# Patient Record
Sex: MALE | Race: BLACK OR AFRICAN AMERICAN | NOT HISPANIC OR LATINO | Employment: FULL TIME | URBAN - METROPOLITAN AREA
[De-identification: names, ages, dates, MRNs, and addresses within clinical notes are randomized per-mention and may not be internally consistent; named-entity substitution may affect disease eponyms.]

---

## 2021-10-13 ENCOUNTER — HOSPITAL ENCOUNTER (EMERGENCY)
Facility: HOSPITAL | Age: 26
Discharge: PSYCHIATRIC OR SUBSTANCE USE FACILITY | End: 2021-10-13
Attending: EMERGENCY MEDICINE

## 2021-10-13 ENCOUNTER — HOSPITAL ENCOUNTER (INPATIENT)
Facility: HOSPITAL | Age: 26
LOS: 3 days | Discharge: HOME | End: 2021-10-16
Attending: PSYCHIATRY & NEUROLOGY | Admitting: PSYCHIATRY & NEUROLOGY

## 2021-10-13 VITALS
TEMPERATURE: 98.5 F | SYSTOLIC BLOOD PRESSURE: 124 MMHG | OXYGEN SATURATION: 94 % | RESPIRATION RATE: 18 BRPM | DIASTOLIC BLOOD PRESSURE: 83 MMHG | HEIGHT: 72 IN | BODY MASS INDEX: 20.39 KG/M2 | WEIGHT: 150.57 LBS | HEART RATE: 58 BPM

## 2021-10-13 DIAGNOSIS — T50.902A INTENTIONAL DRUG OVERDOSE, INITIAL ENCOUNTER (HCC): ICD-10-CM

## 2021-10-13 DIAGNOSIS — T14.91XA SUICIDE ATTEMPT (HCC): Primary | ICD-10-CM

## 2021-10-13 PROBLEM — F12.10 MARIJUANA ABUSE: Status: ACTIVE | Noted: 2021-10-13

## 2021-10-13 PROBLEM — F32.A DEPRESSION: Status: ACTIVE | Noted: 2021-10-13

## 2021-10-13 PROBLEM — F33.1 MAJOR DEPRESSIVE DISORDER, RECURRENT EPISODE, MODERATE (HCC): Status: ACTIVE | Noted: 2021-10-13

## 2021-10-13 LAB
ALBUMIN SERPL-MCNC: 4.6 G/DL (ref 3.5–5.2)
ALBUMIN/GLOB SERPL: 1.8 G/DL
ALP SERPL-CCNC: 53 U/L (ref 39–117)
ALT SERPL W P-5'-P-CCNC: 8 U/L (ref 1–41)
AMPHET+METHAMPHET UR QL: NEGATIVE
ANION GAP SERPL CALCULATED.3IONS-SCNC: 13.5 MMOL/L (ref 5–15)
APAP SERPL-MCNC: 6.2 MCG/ML (ref 0–30)
APAP SERPL-MCNC: 9.2 MCG/ML (ref 0–30)
APTT PPP: 24 SECONDS (ref 22.2–34.2)
AST SERPL-CCNC: 17 U/L (ref 1–40)
BARBITURATES UR QL SCN: NEGATIVE
BASOPHILS # BLD AUTO: 0.01 10*3/MM3 (ref 0–0.2)
BASOPHILS # BLD AUTO: 0.02 10*3/MM3 (ref 0–0.2)
BASOPHILS NFR BLD AUTO: 0.2 % (ref 0–1.5)
BASOPHILS NFR BLD AUTO: 0.4 % (ref 0–1.5)
BENZODIAZ UR QL SCN: NEGATIVE
BILIRUB SERPL-MCNC: 0.7 MG/DL (ref 0–1.2)
BUN SERPL-MCNC: 10 MG/DL (ref 6–20)
BUN/CREAT SERPL: 9.7 (ref 7–25)
CALCIUM SPEC-SCNC: 8.7 MG/DL (ref 8.6–10.5)
CANNABINOIDS SERPL QL: POSITIVE
CHLORIDE SERPL-SCNC: 103 MMOL/L (ref 98–107)
CO2 SERPL-SCNC: 25.5 MMOL/L (ref 22–29)
COCAINE UR QL: NEGATIVE
CREAT SERPL-MCNC: 1.03 MG/DL (ref 0.76–1.27)
DEPRECATED RDW RBC AUTO: 41.1 FL (ref 37–54)
DEPRECATED RDW RBC AUTO: 41.4 FL (ref 37–54)
EOSINOPHIL # BLD AUTO: 0.12 10*3/MM3 (ref 0–0.4)
EOSINOPHIL # BLD AUTO: 0.2 10*3/MM3 (ref 0–0.4)
EOSINOPHIL NFR BLD AUTO: 2.7 % (ref 0.3–6.2)
EOSINOPHIL NFR BLD AUTO: 4.3 % (ref 0.3–6.2)
ERYTHROCYTE [DISTWIDTH] IN BLOOD BY AUTOMATED COUNT: 12.5 % (ref 12.3–15.4)
ERYTHROCYTE [DISTWIDTH] IN BLOOD BY AUTOMATED COUNT: 12.8 % (ref 12.3–15.4)
ETHANOL BLD-MCNC: <10 MG/DL (ref 0–10)
ETHANOL UR QL: <0.01 %
GFR SERPL CREATININE-BSD FRML MDRD: 105 ML/MIN/1.73
GLOBULIN UR ELPH-MCNC: 2.5 GM/DL
GLUCOSE SERPL-MCNC: 92 MG/DL (ref 65–99)
HCT VFR BLD AUTO: 39.3 % (ref 37.5–51)
HCT VFR BLD AUTO: 40.6 % (ref 37.5–51)
HGB BLD-MCNC: 13.3 G/DL (ref 13–17.7)
HGB BLD-MCNC: 13.8 G/DL (ref 13–17.7)
HOLD SPECIMEN: NORMAL
HOLD SPECIMEN: NORMAL
IMM GRANULOCYTES # BLD AUTO: 0.01 10*3/MM3 (ref 0–0.05)
IMM GRANULOCYTES # BLD AUTO: 0.01 10*3/MM3 (ref 0–0.05)
IMM GRANULOCYTES NFR BLD AUTO: 0.2 % (ref 0–0.5)
IMM GRANULOCYTES NFR BLD AUTO: 0.2 % (ref 0–0.5)
INR PPP: 1.01 (ref 2–3)
LYMPHOCYTES # BLD AUTO: 1.44 10*3/MM3 (ref 0.7–3.1)
LYMPHOCYTES # BLD AUTO: 1.79 10*3/MM3 (ref 0.7–3.1)
LYMPHOCYTES NFR BLD AUTO: 32.4 % (ref 19.6–45.3)
LYMPHOCYTES NFR BLD AUTO: 38.2 % (ref 19.6–45.3)
MCH RBC QN AUTO: 29.9 PG (ref 26.6–33)
MCH RBC QN AUTO: 30.1 PG (ref 26.6–33)
MCHC RBC AUTO-ENTMCNC: 33.8 G/DL (ref 31.5–35.7)
MCHC RBC AUTO-ENTMCNC: 34 G/DL (ref 31.5–35.7)
MCV RBC AUTO: 88.3 FL (ref 79–97)
MCV RBC AUTO: 88.5 FL (ref 79–97)
METHADONE UR QL SCN: NEGATIVE
MONOCYTES # BLD AUTO: 0.55 10*3/MM3 (ref 0.1–0.9)
MONOCYTES # BLD AUTO: 0.66 10*3/MM3 (ref 0.1–0.9)
MONOCYTES NFR BLD AUTO: 12.4 % (ref 5–12)
MONOCYTES NFR BLD AUTO: 14.1 % (ref 5–12)
NEUTROPHILS NFR BLD AUTO: 2.01 10*3/MM3 (ref 1.7–7)
NEUTROPHILS NFR BLD AUTO: 2.32 10*3/MM3 (ref 1.7–7)
NEUTROPHILS NFR BLD AUTO: 42.8 % (ref 42.7–76)
NEUTROPHILS NFR BLD AUTO: 52.1 % (ref 42.7–76)
NRBC BLD AUTO-RTO: 0 /100 WBC (ref 0–0.2)
NRBC BLD AUTO-RTO: 0 /100 WBC (ref 0–0.2)
OPIATES UR QL: NEGATIVE
OXYCODONE UR QL SCN: NEGATIVE
PLATELET # BLD AUTO: 191 10*3/MM3 (ref 140–450)
PLATELET # BLD AUTO: 206 10*3/MM3 (ref 140–450)
PMV BLD AUTO: 9.5 FL (ref 6–12)
PMV BLD AUTO: 9.6 FL (ref 6–12)
POTASSIUM SERPL-SCNC: 3.5 MMOL/L (ref 3.5–5.2)
PROT SERPL-MCNC: 7.1 G/DL (ref 6–8.5)
PROTHROMBIN TIME: 11 SECONDS (ref 9.4–12)
RBC # BLD AUTO: 4.45 10*6/MM3 (ref 4.14–5.8)
RBC # BLD AUTO: 4.59 10*6/MM3 (ref 4.14–5.8)
SALICYLATES SERPL-MCNC: 6.4 MG/DL
SALICYLATES SERPL-MCNC: 7.4 MG/DL
SALICYLATES SERPL-MCNC: 7.5 MG/DL
SARS-COV-2 RNA RESP QL NAA+PROBE: NOT DETECTED
SODIUM SERPL-SCNC: 142 MMOL/L (ref 136–145)
T4 FREE SERPL-MCNC: 1.26 NG/DL (ref 0.93–1.7)
TSH SERPL DL<=0.05 MIU/L-ACNC: 7.55 UIU/ML (ref 0.27–4.2)
WBC # BLD AUTO: 4.45 10*3/MM3 (ref 3.4–10.8)
WBC # BLD AUTO: 4.69 10*3/MM3 (ref 3.4–10.8)
WHOLE BLOOD HOLD SPECIMEN: NORMAL
WHOLE BLOOD HOLD SPECIMEN: NORMAL

## 2021-10-13 PROCEDURE — 80053 COMPREHEN METABOLIC PANEL: CPT | Performed by: NURSE PRACTITIONER

## 2021-10-13 PROCEDURE — 82077 ASSAY SPEC XCP UR&BREATH IA: CPT | Performed by: NURSE PRACTITIONER

## 2021-10-13 PROCEDURE — 80307 DRUG TEST PRSMV CHEM ANLYZR: CPT | Performed by: NURSE PRACTITIONER

## 2021-10-13 PROCEDURE — 84439 ASSAY OF FREE THYROXINE: CPT | Performed by: NURSE PRACTITIONER

## 2021-10-13 PROCEDURE — U0003 INFECTIOUS AGENT DETECTION BY NUCLEIC ACID (DNA OR RNA); SEVERE ACUTE RESPIRATORY SYNDROME CORONAVIRUS 2 (SARS-COV-2) (CORONAVIRUS DISEASE [COVID-19]), AMPLIFIED PROBE TECHNIQUE, MAKING USE OF HIGH THROUGHPUT TECHNOLOGIES AS DESCRIBED BY CMS-2020-01-R: HCPCS | Performed by: NURSE PRACTITIONER

## 2021-10-13 PROCEDURE — 84443 ASSAY THYROID STIM HORMONE: CPT | Performed by: NURSE PRACTITIONER

## 2021-10-13 PROCEDURE — 85730 THROMBOPLASTIN TIME PARTIAL: CPT | Performed by: EMERGENCY MEDICINE

## 2021-10-13 PROCEDURE — 85025 COMPLETE CBC W/AUTO DIFF WBC: CPT | Performed by: PSYCHIATRY & NEUROLOGY

## 2021-10-13 PROCEDURE — 80143 DRUG ASSAY ACETAMINOPHEN: CPT | Performed by: NURSE PRACTITIONER

## 2021-10-13 PROCEDURE — 85610 PROTHROMBIN TIME: CPT | Performed by: EMERGENCY MEDICINE

## 2021-10-13 PROCEDURE — 85025 COMPLETE CBC W/AUTO DIFF WBC: CPT | Performed by: NURSE PRACTITIONER

## 2021-10-13 PROCEDURE — 80179 DRUG ASSAY SALICYLATE: CPT | Performed by: NURSE PRACTITIONER

## 2021-10-13 PROCEDURE — 99285 EMERGENCY DEPT VISIT HI MDM: CPT

## 2021-10-13 PROCEDURE — 80143 DRUG ASSAY ACETAMINOPHEN: CPT | Performed by: EMERGENCY MEDICINE

## 2021-10-13 PROCEDURE — 80179 DRUG ASSAY SALICYLATE: CPT | Performed by: EMERGENCY MEDICINE

## 2021-10-13 RX ORDER — HALOPERIDOL 5 MG/ML
5 INJECTION INTRAMUSCULAR EVERY 4 HOURS PRN
Status: DISCONTINUED | OUTPATIENT
Start: 2021-10-13 | End: 2021-10-16 | Stop reason: HOSPADM

## 2021-10-13 RX ORDER — LORAZEPAM 2 MG/ML
2 INJECTION INTRAMUSCULAR EVERY 4 HOURS PRN
Status: DISCONTINUED | OUTPATIENT
Start: 2021-10-13 | End: 2021-10-16 | Stop reason: HOSPADM

## 2021-10-13 RX ORDER — ACETAMINOPHEN 325 MG/1
650 TABLET ORAL EVERY 4 HOURS PRN
Status: DISCONTINUED | OUTPATIENT
Start: 2021-10-13 | End: 2021-10-16 | Stop reason: HOSPADM

## 2021-10-13 RX ORDER — SODIUM CHLORIDE 0.9 % (FLUSH) 0.9 %
10 SYRINGE (ML) INJECTION AS NEEDED
Status: DISCONTINUED | OUTPATIENT
Start: 2021-10-13 | End: 2021-10-13 | Stop reason: HOSPADM

## 2021-10-13 RX ORDER — ALUMINA, MAGNESIA, AND SIMETHICONE 2400; 2400; 240 MG/30ML; MG/30ML; MG/30ML
15 SUSPENSION ORAL EVERY 6 HOURS PRN
Status: DISCONTINUED | OUTPATIENT
Start: 2021-10-13 | End: 2021-10-16 | Stop reason: HOSPADM

## 2021-10-13 RX ORDER — DIPHENHYDRAMINE HYDROCHLORIDE 50 MG/ML
50 INJECTION INTRAMUSCULAR; INTRAVENOUS EVERY 4 HOURS PRN
Status: DISCONTINUED | OUTPATIENT
Start: 2021-10-13 | End: 2021-10-16 | Stop reason: HOSPADM

## 2021-10-13 RX ORDER — LORAZEPAM 2 MG/1
2 TABLET ORAL EVERY 4 HOURS PRN
Status: DISCONTINUED | OUTPATIENT
Start: 2021-10-13 | End: 2021-10-16 | Stop reason: HOSPADM

## 2021-10-13 RX ORDER — HALOPERIDOL 5 MG/1
5 TABLET ORAL EVERY 4 HOURS PRN
Status: DISCONTINUED | OUTPATIENT
Start: 2021-10-13 | End: 2021-10-16 | Stop reason: HOSPADM

## 2021-10-13 RX ORDER — LOPERAMIDE HYDROCHLORIDE 2 MG/1
2 CAPSULE ORAL
Status: DISCONTINUED | OUTPATIENT
Start: 2021-10-13 | End: 2021-10-16 | Stop reason: HOSPADM

## 2021-10-13 RX ORDER — DIPHENHYDRAMINE HCL 50 MG
50 CAPSULE ORAL EVERY 4 HOURS PRN
Status: DISCONTINUED | OUTPATIENT
Start: 2021-10-13 | End: 2021-10-16 | Stop reason: HOSPADM

## 2021-10-13 RX ORDER — HYDROXYZINE PAMOATE 50 MG/1
50 CAPSULE ORAL EVERY 6 HOURS PRN
Status: DISCONTINUED | OUTPATIENT
Start: 2021-10-13 | End: 2021-10-16 | Stop reason: HOSPADM

## 2021-10-13 RX ADMIN — Medication: at 10:31

## 2021-10-13 RX ADMIN — SERTRALINE HYDROCHLORIDE 50 MG: 50 TABLET ORAL at 15:00

## 2021-10-13 NOTE — H&P
"[facility] [place]   PSYCHIATRIC  HISTORY AND PHYSICAL    Patient Name: Rafael Juarez  : 1995  MRN: [id number]  Primary Care Physician:  Provider, No Known  Date of admission: 10/13/2021    Subjective   Subjective     Chief Complaint: \"My depression and anxiety\"    HPI:     Rafael Juarez is a 26 y.o. male self-referred emergency room admitted on a voluntary basis.  He is brought in by ambulance.  Patient reports that his depression and anxiety get very severe yesterday, he got increasingly upset, and then he took a bunch of pills in a suicide attempt.  He thinks he took Aleve but it could have been Tylenol.  Labs are normal Tylenol level never got into the double digits and decreased on emergency room.  Patient reports he took the overdose because he was very stressed out.  Reports that family life and personal life have been overwhelming.  Patient reports he took the overdose realized it was a mistake called an ambulance.    Patient reports his depression makes him think too much.  Reports that he will feel stuck in his mind and cannot move forward.  Patient also reports he has decreased desire to go out and do things, poor motivation, and does not want to go anywhere.  Patient reports that he is just not himself at this time.  Patient reports ruminating about things and thinking too much.  Reports that his sleep is been decreased for about the last week.  Reports he has had very low energy.  When asked about feeling hopeless or helpless the patient thinks a very long time about this then nods his head that he has felt little hopeless or helpless.  He denies suicidal ideation today and asked what is different he reports he knows that he can get help, knows that he has an outlet now, and just has a brighter outlook on life.    Patient reports he needs to work on how to cope and deal learned how to feel relief from the stress.  Patient reports that he is also got some rest which is helped his " depression.  Patient reports that he feels he needs to take some pressure off of his life and feels that depression is beginning to alleviate now.    Patient also reports he feels like he is very poor self-control.  Reports that he cannot get in her routine despite trying to make a schedule for himself.  Reports that he will make schedules, put them in his phone, never helped himself accountable.  Reports that he just will sit and not do what he needs to do on a daily basis.  Reports is not consistent on how he acts or behaves.        Review of Systems:      CONSTITUTIONAL: no fever, no night sweats  HEENT: no blurring of vision, no double vision, no hearing difficulty, no tinnitus, no dysplasia, no epistaxis  LUNGS: no cough, no hemoptysis, no wheeze, no shortness of breath;  CARDIOVASCULAR: no palpitation, no chest pain, no shortness of breath;  GI: no abdominal pain, no nausea, no vomiting, no diarrhea, no constipation;  KELSI: no dysuria, no hematuria, no frequency or urgency, no nephrolithiasis;  MUSCULOSKELETAL: no joint pain, no swelling, no stiffness;  ENDOCRINE: no polyuria, no polydipsia, no cold or heat intolerance;  HEMATOLOGY: no easy bruising or bleeding, no history of clotting disorder;  DERMATOLOGY: no skin rash, no eczema, no pruritus;  NEUROLOGY: no syncope, no seizures, no numbness or tingling of hands, no numbness or tingling of feet, no paresis;    Personal History     Past Medical History:   Diagnosis Date   • Bipolar depression (HCC)    • Schizoaffective disorder (HCC)    • Seizures (HCC)        No past surgical history on file.    Past Psychiatric History: Does not have a current provider.  Reports he saw a provider a couple years ago.  Reports previous diagnosis of schizoaffective disorder bipolar type.  Not currently on any medications.    Psychiatric Hospitalizations: Reports 2 previous hospitalizations to this    Suicide Attempts: Reports a history of 4-5 previous suicide attempts    Prior  Treatment and Medications Tried: Does not recall names of previous medicines he is been on, but reports he is reluctant to take medicines because he has no self-control no matter what pill he put them on he will take them all.      Family History: family history includes Deep vein thrombosis in his mother; Depression in his maternal grandmother, mother, and sister; Hypertension in his father; Schizophrenia in his paternal uncle; Suicide Attempts in his sister. Otherwise pertinent FHx was reviewed and not pertinent to current issue.      Social History: Born and raised in [place].  Moved to [facility] [place].  Moved at [facility] from [id number] now back in [place] since August of this year.  Came back to [place] because girlfriend was here.  Patient reports she went to school into the 12th grade but his credits got messed up and was told he had do another year high school refused to do it did not officially graduate.  He does not have a GED.  He has been  on one occasion .  He has 2 children from 2 different mothers.  Currently lives with a girlfriend.  He is currently unemployed.  Never been in .  Denies any history of abuse.  Denies that he is Baptism but reports he is very spiritual.  Patient has been depressed in the past.  He reports he does have some current charges and supposed to go to court on Friday of this week.      Substance Abuse History: reports that he has been smoking. He does not have any smokeless tobacco history on file. He reports previous alcohol use. He reports current drug use. Drug: Marijuana.    Home Medications:          Allergies:  No Known Allergies    Objective   Objective     Vitals:   Temp:  [97.7 °F (36.5 °C)-98.5 °F (36.9 °C)] 97.7 °F (36.5 °C)  Heart Rate:  [47-75] 57  Resp:  [18] 18  BP: (106-128)/(71-86) 106/71    Physical Exam:     CONSTITUTIONAL: Patient is well developed, well nourished, awake and alert.  HEENT: Head and  neck are normocephalic and atraumatic. Pupils equal and  round.  Sclerae clear. No icterus.  LUNGS: Even unlabored respirations.  CARDIAC: Normal rate and rhythm.  ABDOMEN: Nondistended.  SKIN: Clean, dry, intact.  EXTREMITIES: No clubbing, cyanosis, edema.  MUSCULOSKELETAL: Symmetric body habitus. Spine straight. Strength intact,  full range of motion.  NEUROLOGIC: Appropriate. No abnormal movements, good muscle tone.  Cerebellar  shows station and gait steady.  Cranial Nerves:  CN II: Visual fields without deficit.  CN III: Pupils symmetric.  CN III, IV, VI:  Extraocular eye muscles intact, no nystagmus.  CN V: Jaw open and closing normal.  CN VII: Frown and smile symmetric.  CN VIII: Hearing intact.  CN IX, X: Palate rise normal; phonation without hoarseness.  CN XI: Shoulder shrug equal.  CN XII: Tongue midline, no fasciculations, no dysarthria.    Mental Status Exam:    Hygiene:   good  Cooperation:  Cooperative  Eye Contact:  Good  Psychomotor Behavior:  Appropriate  Affect:  Restricted  Speech:  Normal  Thought Progress:  Goal directed and Linear  Thought Content:  Normal  Suicidal:  None and Denies today, remorseful of the time  Homicidal:  None  Hallucinations:  None  Delusion:  None  Memory:  Intact  Orientation:  Person, Place, Time and Situation  Reliability:  good  Insight:  Fair  Judgement:  Impaired  Impulse Control:  Impaired  Physical/Medical Issues:  No   Reports he continues to have somewhat of a down mood but improved.  No restlessness or agitation.  Calm and cooperative to exam.    Result Review    Result Review:  I have personally reviewed the results from the time of this admission to 10/13/2021 14:15 EDT and agree with these findings:  []  Laboratory  []  Microbiology  []  Radiology  []  EKG/Telemetry   []  Cardiology/Vascular   []  Pathology  []  Old records  []  Other:  Most notable findings include: No pertinent negative or positives    Assessment/Plan   Assessment / Plan     Brief Patient  Summary:  Rafael Juarez is a 26 y.o. male who self-referred emergency room admitted on a voluntary basis.  Reports depression status post overdose and suicide attempt    Active Hospital Problems:  Active Hospital Problems    Diagnosis    • Major depressive disorder, recurrent episode, moderate (HCC)    • Marijuana abuse        Plan:   1) begin sertraline 50 mg daily for depression  2) work on mood stabilization abatement of suicidal ideation work on appropriate safety plan  3) continue supportive therapy  4) patient engage in all group and individual treatment modalities available on the unit  5) work on appropriate disposition follow-up  6) estimate length stay in hospital 3 to 4 days    DVT prophylaxis:  Mechanical DVT prophylaxis orders are present.    CODE STATUS:    Code Status: CPR  Medical Interventions (Level of Support Prior to Arrest): Full      Admission Status:  I believe this patient meets inpatient status.    Electronically signed by Humble Barkley MD, 10/13/21, 1:56 PM EDT.

## 2021-10-13 NOTE — NURSING NOTE
26 y/u male pt. Was admitted from the [facility] unit at 0841 to the services of dr. Ernandez. Pt. On arrival was searched for sharps and contraband by security. Per report from Ed pt. Had overdosed on tylenol or ibuprofen , then vomited and called EMS. Pt. On arrival was calm and cooperative and deneid any si/hi at this time and contracted for safety. Pt. Reports that he hears voices and his body has to do what they say. Pt. Did not appear to be responding to any internal stimuli, ate a snack and went to sleep. Bathroom door locked for safety, pt. Was seen by dr. ernandez who discontinued his CW. Will con't to monitor and provide a safe environment.

## 2021-10-13 NOTE — CASE MANAGEMENT/SOCIAL WORK
"DATA: Met with patient today following admission. Pt reports significant amount of apathy that led to overdose. He denies any significant family support. Has limited support from current girlfriend , елена. He has 2 children by 2 other females, елена is also pregnant, they just found this out recently. He states that he feels, \"underappreciated\". He believes that he has lots of talents and gifts but obstacles and life gets in his way and he just feels like giving up. His mother is , father was mostly absent during childhood. He has had legal issues, and has current charges pending. He says that he would probably be better off incarcerated because he would be provided for and would not have to make decisions and provide for his own food and housing. He states that cant trust himself with pills, if he is given pills again he will probably take too many because that is just what he does. Pt agrees that he could delgate medication to be supervised and given to girlfriend to ensure safe medication administration.      .  Discharge Planning Assessment   Laura     Patient Name: Rafael Juarez  MRN: [id number]  Today's Date: 10/13/2021    Admit Date: 10/13/2021     Discharge Needs Assessment    No documentation.                Discharge Plan     Row Name 10/13/21 1802       Plan    Plan Return home              Continued Care and Services - Admitted Since 10/13/2021    Coordination has not been started for this encounter.          Demographic Summary    No documentation.                Functional Status     Row Name 10/13/21 1801       Functional Status    Usual Activity Tolerance excellent    Current Activity Tolerance excellent       Functional Status, IADL    Medications independent    Meal Preparation independent    Housekeeping independent    Laundry independent    Shopping independent       Mental Status    General Appearance WDL WDL       Mental Status Summary    Recent Changes in Mental " Status/Cognitive Functioning no changes       Employment/    Employment Status employment seeking               Psychosocial    No documentation.                Abuse/Neglect    No documentation.                Legal     Row Name 10/13/21 0785       Financial/Legal    Source of Income none               Substance Abuse    No documentation.                Patient Forms    No documentation.                   Mary Jonas LCSW

## 2021-10-13 NOTE — ED NOTES
Spoke with April from poison control. Recommended UDS, ASA, ETOH, CMP and timed tylenol level at 0445. Symptomatic care. MD Jewels aware.     Cami Day, RN  10/13/21 5695

## 2021-10-13 NOTE — ED NOTES
April, Poison control updated. Recommends repeat salicylate at 0730 and if declining pt can be medically cleared. MD Jewels notified.     Cami Day RN  10/13/21 0604

## 2021-10-13 NOTE — ED NOTES
Pt to ED via ambulance. Pt with SI. Pt states he took a whole bottle of medication approx 20-25 pills at 0045, unsure if tylenol or ibuprofen. Pt states vomiting after ingestion. Pt alert and oriented. VSS.    Pt hx schizoaffective, bipolar, depression, SZ. Pt not on any home medications.     Cami Day, RN  10/13/21 0303       Cami Day, RN  10/13/21 0336

## 2021-10-13 NOTE — ED PROVIDER NOTES
Time: 3:08 AM EDT  Arrived by: ambulance  Chief Complaint:   Chief Complaint   Patient presents with   • Suicidal     History provided by: pt and EMS  History is limited by: N/A     History of Present Illness:  Patient is a 26 y.o. year old male that presents to the emergency department with SUICIDAL.    Pt states he tried to kill himself tonight around 1245 am by taking either tylenol or ibuprofen. Pt states he called EMS and vomited. Pt states he feels tired, but is otherwise fine.   Pt has hx of depression, bipolar, and schizophrenia. Pt smokes marijuana but denies any other drugs or alcohol.            History provided by:  Patient and EMS personnel   used: No    Suicidal  Presenting symptoms: suicidal thoughts and suicide attempt    Presenting symptoms: no aggressive behavior, no agitation, no bizarre behavior, no delusions, no disorganized speech, no disorganized thought process, no hallucinations, no homicidal ideas, no paranoid behavior, no self-mutilation and no suicidal threats    Degree of incapacity (severity):  Moderate  Onset quality:  Sudden  Timing:  Constant  Progression:  Improving  Chronicity:  New  Relieved by:  None tried  Worsened by:  Family interactions  Ineffective treatments:  None tried  Associated symptoms: fatigue    Associated symptoms: no abdominal pain, no chest pain and no headaches        Similar Symptoms Previously: none  Recently seen: not recently seen in this ED    Patient Care Team  Primary Care Provider: Provider, No Known    Past Medical History:     No Known Allergies  Past Medical History:   Diagnosis Date   • Bipolar depression (HCC)    • Schizoaffective disorder (HCC)    • Seizures (HCC)      History reviewed. No pertinent surgical history.  History reviewed. No pertinent family history.    Home Medications:  Prior to Admission medications    Not on File        Social History:   Social History     Tobacco Use   • Smoking status: Current Some Day Smoker  "  • Smokeless tobacco: Not on file   Substance Use Topics   • Alcohol use: Not Currently   • Drug use: Yes     Types: Marijuana     Recent travel: not applicable     Review of Systems:  Review of Systems   Constitutional: Positive for fatigue. Negative for chills and fever.   HENT: Negative for congestion, ear pain, rhinorrhea, sore throat and tinnitus.    Eyes: Negative for photophobia and pain.   Respiratory: Negative for choking and shortness of breath.    Cardiovascular: Negative for chest pain, palpitations and leg swelling.   Gastrointestinal: Positive for vomiting. Negative for abdominal distention, abdominal pain, diarrhea and nausea.   Endocrine: Negative for polydipsia.   Genitourinary: Negative for difficulty urinating, dysuria and hematuria.   Musculoskeletal: Negative for back pain, gait problem and neck pain.   Skin: Negative for rash.   Neurological: Negative for dizziness, seizures, speech difficulty, weakness, light-headedness, numbness and headaches.   Hematological: Negative for adenopathy.   Psychiatric/Behavioral: Positive for suicidal ideas. Negative for agitation, confusion, hallucinations, homicidal ideas, paranoia and self-injury.        Physical Exam:  /78   Pulse 58   Temp 98.5 °F (36.9 °C) (Oral)   Resp 18   Ht 182.9 cm (72\")   Wt 68.3 kg (150 lb 9.2 oz)   SpO2 96%   BMI 20.42 kg/m²     Physical Exam  Vitals and nursing note reviewed.   Constitutional:       Appearance: Normal appearance. He is well-developed.   HENT:      Head: Normocephalic and atraumatic.      Right Ear: External ear normal.      Left Ear: External ear normal.      Nose: Nose normal.      Mouth/Throat:      Mouth: Mucous membranes are moist.      Pharynx: Oropharynx is clear.   Eyes:      General: Lids are normal.      Extraocular Movements: Extraocular movements intact.      Conjunctiva/sclera: Conjunctivae normal.      Pupils: Pupils are equal, round, and reactive to light.   Cardiovascular:      Rate " and Rhythm: Normal rate and regular rhythm.      Pulses: Normal pulses.      Heart sounds: Normal heart sounds. No murmur heard.      Pulmonary:      Effort: Pulmonary effort is normal.      Breath sounds: Normal breath sounds. No stridor. No wheezing.   Abdominal:      Palpations: Abdomen is soft.      Tenderness: There is no abdominal tenderness.   Musculoskeletal:         General: Normal range of motion.      Cervical back: Full passive range of motion without pain, normal range of motion and neck supple.      Right lower leg: No edema.      Left lower leg: No edema.   Skin:     General: Skin is warm and dry.      Capillary Refill: Capillary refill takes less than 2 seconds.   Neurological:      General: No focal deficit present.      Mental Status: He is alert and oriented to person, place, and time. Mental status is at baseline.      Cranial Nerves: Cranial nerves are intact.      Sensory: Sensation is intact.      Motor: Motor function is intact.      Coordination: Coordination is intact.   Psychiatric:         Attention and Perception: Attention and perception normal.         Mood and Affect: Mood and affect normal.         Speech: Speech normal.         Behavior: Behavior normal. Behavior is cooperative.         Thought Content: Thought content includes suicidal ideation. Thought content includes suicidal plan.         Cognition and Memory: Cognition and memory normal.                Medications in the Emergency Department:  Medications   sodium chloride 0.9 % flush 10 mL (has no administration in time range)        Labs  Lab Results (last 24 hours)     Procedure Component Value Units Date/Time    CBC & Differential [[id number]]  (Abnormal) Collected: 10/13/21 0315    Specimen: Blood Updated: 10/13/21 0322    Narrative:      The following orders were created for panel order CBC & Differential.  Procedure                               Abnormality         Status                     ---------                                -----------         ------                     CBC Auto Differential[[id number]]        Abnormal            Final result                 Please view results for these tests on the individual orders.    Comprehensive Metabolic Panel [[id number]] Collected: 10/13/21 0315    Specimen: Blood Updated: 10/13/21 0346     Glucose 92 mg/dL      BUN 10 mg/dL      Creatinine 1.03 mg/dL      Sodium 142 mmol/L      Potassium 3.5 mmol/L      Chloride 103 mmol/L      CO2 25.5 mmol/L      Calcium 8.7 mg/dL      Total Protein 7.1 g/dL      Albumin 4.60 g/dL      ALT (SGPT) 8 U/L      AST (SGOT) 17 U/L      Alkaline Phosphatase 53 U/L      Total Bilirubin 0.7 mg/dL      eGFR  African Amer 105 mL/min/1.73      Globulin 2.5 gm/dL      A/G Ratio 1.8 g/dL      BUN/Creatinine Ratio 9.7     Anion Gap 13.5 mmol/L     Narrative:      GFR Normal >60  Chronic Kidney Disease <60  Kidney Failure <15      Acetaminophen Level [[id number]]  (Normal) Collected: 10/13/21 0315    Specimen: Blood Updated: 10/13/21 0346     Acetaminophen 9.2 mcg/mL     Ethanol [[id number]] Collected: 10/13/21 0315    Specimen: Blood Updated: 10/13/21 0346     Ethanol <10 mg/dL      Ethanol % <0.010 %     Narrative:      Ethanol (Plasma)  <10 Essentially Negative    Toxic Concentrations           mg/dL    Flushing, slowing of reflexes    Impaired visual activity         Depression of CNS              >100  Possible Coma                  >300       Salicylate Level [[id number]]  (Normal) Collected: 10/13/21 0315    Specimen: Blood Updated: 10/13/21 0346     Salicylate 7.5 mg/dL     Urine Drug Screen - Urine, Clean Catch [[id number]]  (Abnormal) Collected: 10/13/21 0315    Specimen: Urine, Clean Catch Updated: 10/13/21 0349     Amphet/Methamphet, Screen Negative     Barbiturates Screen, Urine Negative     Benzodiazepine Screen, Urine Negative     Cocaine Screen, Urine Negative     Opiate Screen Negative     THC, Screen, Urine Positive     Methadone Screen,  Urine Negative     Oxycodone Screen, Urine Negative    Narrative:      Negative Thresholds Per Drugs Screened:    Amphetamines                 500 ng/ml  Barbiturates                 200 ng/ml  Benzodiazepines              100 ng/ml  Cocaine                      300 ng/ml  Methadone                    300 ng/ml  Opiates                      300 ng/ml  Oxycodone                    100 ng/ml  THC                           50 ng/ml    The Normal Value for all drugs tested is negative. This report includes final unconfirmed screening results to be used for medical treatment purposes only. Unconfirmed results must not be used for non-medical purposes such as employment or legal testing. Clinical consideration should be applied to any drug of abuse test, particularly when unconfirmed results are used.            CBC Auto Differential [[id number]]  (Abnormal) Collected: 10/13/21 0315    Specimen: Blood Updated: 10/13/21 0322     WBC 4.45 10*3/mm3      RBC 4.59 10*6/mm3      Hemoglobin 13.8 g/dL      Hematocrit 40.6 %      MCV 88.5 fL      MCH 30.1 pg      MCHC 34.0 g/dL      RDW 12.8 %      RDW-SD 41.4 fl      MPV 9.6 fL      Platelets 206 10*3/mm3      Neutrophil % 52.1 %      Lymphocyte % 32.4 %      Monocyte % 12.4 %      Eosinophil % 2.7 %      Basophil % 0.2 %      Immature Grans % 0.2 %      Neutrophils, Absolute 2.32 10*3/mm3      Lymphocytes, Absolute 1.44 10*3/mm3      Monocytes, Absolute 0.55 10*3/mm3      Eosinophils, Absolute 0.12 10*3/mm3      Basophils, Absolute 0.01 10*3/mm3      Immature Grans, Absolute 0.01 10*3/mm3      nRBC 0.0 /100 WBC     T4, Free [[id number]]  (Normal) Collected: 10/13/21 0315    Specimen: Blood Updated: 10/13/21 0351     Free T4 1.26 ng/dL     Narrative:      Results may be falsely increased if patient taking Biotin.      TSH [[id number]]  (Abnormal) Collected: 10/13/21 0315    Specimen: Blood Updated: 10/13/21 0351     TSH 7.550 uIU/mL     Protime-INR [[id number]]  (Abnormal) Collected:  10/13/21 0315    Specimen: Blood Updated: 10/13/21 0347     Protime 11.0 Seconds      INR 1.01    Narrative:      Suggested Therapeutic Ranges For Oral Anticoagulant Therapy:  Level of Therapy                      INR Target Range  Standard Dose                            2.0-3.0  High Dose                                2.5-3.5  Patients not receiving anticoagulant  Therapy Normal Range                     0.6-1.2    aPTT [[id number]]  (Normal) Collected: 10/13/21 0315    Specimen: Blood Updated: 10/13/21 0347     PTT 24.0 seconds     Acetaminophen Level [[id number]]  (Normal) Collected: 10/13/21 0452    Specimen: Blood Updated: 10/13/21 0522     Acetaminophen 6.2 mcg/mL     Salicylate Level [[id number]] Collected: 10/13/21 0452    Specimen: Blood Updated: 10/13/21 0535           Imaging:  No Radiology Exams Resulted Within Past 24 Hours    Procedures:  Procedures    Progress                            Medical Decision Making:  MDM  Number of Diagnoses or Management Options  Diagnosis management comments: Patient presented emergency department after a suicide attempt.  Patient states he took about 20 tablets of what he believes was Tylenol.  They were 500 mg tablets.  He states he vomited after this.  Poison control was contacted who said likely not a toxic dose but recommended 4-hour Tylenol level.  Her Tylenol level went down from 9 to 6.  Labs show no other significant abnormalities.  Discussed patient with psychiatrist and he will be admitted to [facility] for further care.       Amount and/or Complexity of Data Reviewed  Clinical lab tests: ordered and reviewed  Tests in the radiology section of CPT®: ordered and reviewed  Tests in the medicine section of CPT®: reviewed and ordered  Review and summarize past medical records: yes  Independent visualization of images, tracings, or specimens: yes    Risk of Complications, Morbidity, and/or Mortality  Presenting problems: moderate  Diagnostic procedures:  moderate  Management options: moderate    Patient Progress  Patient progress: stable       Final diagnoses:   Suicide attempt (HCC)   Intentional drug overdose, initial encounter (HCC)        Disposition:  ED Disposition     ED Disposition Condition Comment    Discharge to Behavioral Health Stable           This medical record created using voice recognition software and may contain unintended errors.    Documentation assistance provided by Cherise Mcgowan acting as scribe for Nick Donis MD. Information recorded by the scribe was done at my direction and has been verified and validated by me.          Cherise Mcgowan  10/13/21 0353       Nick Donis MD  10/13/21 0557

## 2021-10-14 LAB
ALBUMIN SERPL-MCNC: 4.4 G/DL (ref 3.5–5.2)
ALBUMIN/GLOB SERPL: 1.9 G/DL
ALP SERPL-CCNC: 53 U/L (ref 39–117)
ALT SERPL W P-5'-P-CCNC: 8 U/L (ref 1–41)
ANION GAP SERPL CALCULATED.3IONS-SCNC: 12.6 MMOL/L (ref 5–15)
AST SERPL-CCNC: 16 U/L (ref 1–40)
BILIRUB SERPL-MCNC: 1.3 MG/DL (ref 0–1.2)
BUN SERPL-MCNC: 10 MG/DL (ref 6–20)
BUN/CREAT SERPL: 10.2 (ref 7–25)
CALCIUM SPEC-SCNC: 8.9 MG/DL (ref 8.6–10.5)
CHLORIDE SERPL-SCNC: 104 MMOL/L (ref 98–107)
CO2 SERPL-SCNC: 22.4 MMOL/L (ref 22–29)
CREAT SERPL-MCNC: 0.98 MG/DL (ref 0.76–1.27)
GFR SERPL CREATININE-BSD FRML MDRD: 111 ML/MIN/1.73
GLOBULIN UR ELPH-MCNC: 2.3 GM/DL
GLUCOSE SERPL-MCNC: 81 MG/DL (ref 65–99)
POTASSIUM SERPL-SCNC: 3.8 MMOL/L (ref 3.5–5.2)
PROT SERPL-MCNC: 6.7 G/DL (ref 6–8.5)
SODIUM SERPL-SCNC: 139 MMOL/L (ref 136–145)

## 2021-10-14 PROCEDURE — 80053 COMPREHEN METABOLIC PANEL: CPT | Performed by: PSYCHIATRY & NEUROLOGY

## 2021-10-14 RX ORDER — TRAZODONE HYDROCHLORIDE 50 MG/1
100 TABLET ORAL NIGHTLY PRN
Status: DISCONTINUED | OUTPATIENT
Start: 2021-10-14 | End: 2021-10-15

## 2021-10-14 RX ADMIN — TRAZODONE HYDROCHLORIDE 100 MG: 50 TABLET ORAL at 21:50

## 2021-10-14 NOTE — PLAN OF CARE
Goal Outcome Evaluation:  Plan of Care Reviewed With: patient  Patient Agreement with Plan of Care: agrees    Patient has been awake and alert throughout day, intermittent  eye contact, engages well, contracts for safety, denies SI/HI,  pt state he hears voices and see things but will not elaborate, pt rate depression 6/10, anxiety 0/10, pt is unable to rate coping and hopelessness, pt state a goal, patient is pleasant and cooperative, but flat affect, patient refused  Zoloft in AM, will continue to monitor.

## 2021-10-14 NOTE — PLAN OF CARE
Goal Outcome Evaluation:  Patient progressing towards goals, with improvement. Patient has been mostly withdrawn to room last evening, calm and cooperative with all care.  Patient able to make needs known.  Patient verbalizes that he does not have a lot of emotional support right now in his life and that is stressful. Reports that overdosing was very impulsive and he was feeling overwhelmed and unappreciated and depression and anxiety levels were both extremely high at that time, but is better today, not as intense. Patient denies any current suicidal thoughts and CFS.  Patient states his thoughts often become too intense and can't cope and there is no one to turn to. Patient did verbalize he feels he can't trust himself around medication and worries he will do it again, hoping that his girlfriend will be willing to assist him with medications.  Patient provided with emotional support and safe environment provided.

## 2021-10-14 NOTE — PLAN OF CARE
Goal Outcome Evaluation: Met with patient, discussed current treatment. Pt reports that he has learned from this and does not want to repeat this experience. He acknowledges that he has negative thinking patterns. We discussed techniques to combat those patterns and focus on positive attributes and future goals. Pt requested that I contact his  to notify him of hospitalization and work out an arrangement for him to participate in court via phone or be excused due to hospitalization. Message left for . Pt may notify friends and family about visitation tonight.   Pt discussed concerns about medications, message left for Dr Barkley.

## 2021-10-14 NOTE — PLAN OF CARE
Goal Outcome Evaluation:  Plan of Care Reviewed With: patient  Patient Agreement with Plan of Care: agrees     Patient is awake and alert, contracts for safety, denies SI/HI, stated he is always hearing voices and seeing things, no sign of internal stimuli, refused to take Zoloft, intermittent eye contact, engages well but guarded at times, seems to have difficulty explaining his situation, not goal oriented, able to make needs know, will continue to monitor.

## 2021-10-14 NOTE — PLAN OF CARE
"Goal Outcome Evaluation:  Plan of Care Reviewed With: patient  Patient Agreement with Plan of Care: agrees    Pt is awake and alert, able to make needs known , clear speech, flat affect, pleasant cooperative mood, pt denies current SI/ HI, state he hears voices and see things. No internal stimuli noted. Rates depression 6/10, anxiety 0/10, unable to set a goal, stated he is coping well and feels better.\"  Intermittent eye contact, contracts for safety, been withdrawn to room throughout day, will continue to monitor             "

## 2021-10-14 NOTE — PROGRESS NOTES
Psychiatric     Psychiatric Progress Note    Patient Name: Rafael Juarez  : 1995  MRN: [id number]  Primary Care Physician:  Provider, No Known  Date of admission: 10/13/2021    Subjective   Subjective     Staff reports the patient's been denying suicidal homicidal ideation.  He slept well last night.  Continues to express to staff being very worried about his impulsivity and overdosing on any prescription medication.  Staff is also reported that he appears to be somewhat apathetic and has talked about just not caring about anything.    Patient day is calm and cooperative.  He reports he is feeling pretty well.  He is denying suicidal ideation today but is concerned about his impulsivity and overdosing.  He has had no problem with the medications.  He did sleep well last night.  Reports his day yesterday was somewhat rough because he slept a lot during the day and then tossed and turned some during the night.  Also had an upset stomach but that is been alleviated today.    Had a very long discussion with the patient about the future and his wellbeing.  Discussed his negative thought structure and catastrophic thinking.  Patient is very engaged in the therapeutic process and is showing some significant insight.  However he appears to be somewhat hopeless and bleak about the future but is willing to engage in ways to work around that.  Does have a significant amount of depression.  Patient talked about goals.  We discussed interpersonal relationships and his reported need to get feelings of support from girlfriend or any girlfriend he may have.  Continues to grieve the loss of his mother who is a very significant support system for him.  Discussed the impact of not growing up with his father, or having father be around to help guide him through life and give him some life lessons.  Dad was an over the road  and gone for weeks at a time.  Patient does have relationship with father and can  build on it.  Patient also feels [place] is toxic and would like to go back to [place].  Patient is very engaging.  He is very pleasant.  Has had some difficulties with the law and some longterm time in the past but seems to really be going on fortunate snowballed.    Objective   Objective     Vitals:   Temp:  [97.7 °F (36.5 °C)-98.2 °F (36.8 °C)] 98.2 °F (36.8 °C)  Heart Rate:  [53-59] 53  Resp:  [16] 16  BP: (107-121)/(65-76) 121/76    No physical problems      Mental Status Exam:       Appearance:   Well-developed, well-nourished, calm, engaging, cooperative, very psychologically minded and willing to engage in therapy and explore his thoughts  Reliability:   Good  Eye Contact:   Good  Concentration/Focus:    Appropriate, attentive to interview  Behaviors:    No restlessness or agitation  Memory :    Sensorium intact, no deficit  Speech:    Normal rate and volume  Language:   Appropriate, relevant  Mood :    Dysthymic, reported as depressed  Affect:    Congruent with stated mood and blunted  Thought process:    Linear, goal-directed, somewhat negative with catastrophic thinking  Thought Content:    Denies suicidal homicidal ideation, no auditory visual hallucination  Insight:   Fair, showing some improvement  Judgement:    Intact    Result Review    Result Review:  I have personally reviewed the results from the time of this admission to 10/14/2021 11:55 EDT and agree with these findings:  []  Laboratory  []  Microbiology  []  Radiology  []  EKG/Telemetry   []  Cardiology/Vascular   []  Pathology  []  Old records  []  Other:  Most notable findings include: No new lab  Medications:   !Patient Home Medications Stored in Pharmacy, , Does not apply, BID  sertraline, 50 mg, Oral, Daily        Assessment/Plan   Assessment / Plan       Active Hospital Problems:  Active Hospital Problems    Diagnosis    • Major depressive disorder, recurrent episode, moderate (HCC)    • Marijuana abuse        Plan:   1) continue current  treatment protocol titrate medications as clinically indicated  2) continue supportive therapy  3) patient to engage in all group and individual treatment modalities available on the unit  4) work on mood stabilization abatement of suicidal ideation and appropriate safety plan  6) attempt to gain collateral information if possible  7) work on appropriate disposition follow-up      Disposition:  I expect patient to be discharged 2 to 3 days.    Electronically signed by Humble Barkley MD, 10/14/21, 11:55 AM EDT.

## 2021-10-15 RX ORDER — VENLAFAXINE HYDROCHLORIDE 75 MG/1
75 CAPSULE, EXTENDED RELEASE ORAL
Status: DISCONTINUED | OUTPATIENT
Start: 2021-10-15 | End: 2021-10-16 | Stop reason: HOSPADM

## 2021-10-15 RX ORDER — HYDROXYZINE PAMOATE 50 MG/1
100 CAPSULE ORAL NIGHTLY PRN
Status: DISCONTINUED | OUTPATIENT
Start: 2021-10-15 | End: 2021-10-16 | Stop reason: HOSPADM

## 2021-10-15 RX ADMIN — HYDROXYZINE PAMOATE 100 MG: 50 CAPSULE ORAL at 21:30

## 2021-10-15 RX ADMIN — VENLAFAXINE HYDROCHLORIDE 75 MG: 75 CAPSULE, EXTENDED RELEASE ORAL at 13:01

## 2021-10-15 NOTE — PLAN OF CARE
Goal Outcome Evaluation: Pt reports improvements with support. He is optimistic about potential benefits of treatment. He has spoken with his father and they are making plans for him to join family . Pt is communicating by phone with his  and hopeful that his legal issues will not interfere with his plans. Pt denies any concerns about travel due to legal issues. Assisted pt with information on local bus transportation.

## 2021-10-15 NOTE — PLAN OF CARE
Goal Outcome Evaluation:  Plan of Care Reviewed With: patient  Patient Agreement with Plan of Care: agrees     Progress: improving  Patient has been up and out of room more this shift, has been calm, pleasant and engaging.  Patient  makes good eye contact during engagement, shares insight and is accepting of education and emotional support.  Patient reports he still has auditory hallucinations and describes them as talking to himself constantly.  Patient states he really feels like the lack of available emotional support here in [place] has had an impact and the night he and his girlfriend argued and he left and was then rejected by his Auntie was too much for him to deal with causing the suicide attempt.  Patient states he has some rough plans of returning to [place] where he is originally from and has more family support there.  Patient states he is focusing more on himself, being humble and trying to cope with his mental health.  Patient discussed the reasons for his refusal of Zoloft today, stating that he had taken it in the past, but for only 10 days and then had overdosed on 50 tablets all at once, later feeling the ill effects from it.  Discussed with patient the need to avoid such actions in the future and patient was provided with education on working with the doctor to find the right type of medication for his symptoms.  Patient strongly encouraged to have a discussion with the doctor tomorrow about his medicine, and patient verbalized understanding.  Patient denies any current S/I and continues top contract for safety.  Emotional support and safe environment provided.

## 2021-10-15 NOTE — PLAN OF CARE
Goal Outcome Evaluation:  Plan of Care Reviewed With: patient  Patient Agreement with Plan of Care: agrees         Pt has been withdrawn to room resting much of the day.  He has been given his safety plan to complete and verbalizes understanding. He denies SI/HI, A/V hallucinations.  He is able to maintain good eye contact.  He is able to make his needs known.  He rates anxiety 4 and depression 6.  He states he would like to discharge tomorrow afternoon so that he can catch a bus to his dad's in [place].  Advised to address with MD in am.  Verbalized understanding.  Will continue to monitor.

## 2021-10-15 NOTE — PROGRESS NOTES
[facility]     Psychiatric Progress Note    Patient Name: Rafael Juarez  : 1995  MRN: [id number]  Primary Care Physician:  Provider, No Known  Date of admission: 10/13/2021    Subjective   Subjective     Staff reports the patient refused sertraline because he is been on it in the past but did not like it and that he had overdosed on it in the past and is not comfortable being on the medication.  We discussed use of venlafaxine and he was agreeable to that medication.    Staff reports the patient has been calm and cooperative.  He is able to process within his internal dialogue with some of his thoughts about depression and anxiety.  He has been engaging cooperative with care.    Patient today reports he continues to feel better.  Patient reports he talked to his stepfather in [place] who can help lined up some employment for him.  Patient is actually very happy about this and is [place] and goal-directed.  He does have some legal issues here in [place] that may prohibit him from leaving, but he has been in contact with his  and we will make contact with the  to see if the patient is able to leave the state.  He states family is willing to help with the bus ticket.    Patient reports that his sleep continues to be broken.  Patient reports he is much better than he was 2 days ago.  Reports that he has a better outlook.  He is denying suicidal ideations at this time.  Patient reports that he is better because he now has some goals and has what he thinks is a clear path in front of him.  Having specific plans as well as potential appointment lined up and being back in [place] with family has given him a new sense of hope.      Objective   Objective     Vitals:   Temp:  [97.6 °F (36.4 °C)-97.8 °F (36.6 °C)] 97.8 °F (36.6 °C)  Heart Rate:  [] 117  Resp:  [16-18] 18  BP: (107-123)/(67-76) 107/67    No physical complaints      Mental Status Exam:       Appearance:    "Well-developed, well-nourished, calm, cooperative engaging man with appropriate ADLs  Reliability:   Good  Eye Contact:   Good  Concentration/Focus:    Intact, normal  Behaviors:    No restlessness or agitation  Memory :    Sensorium intact, no deficit  Speech:    Normal rate and volume  Language:   Appropriate, relevant  Mood :    \"Okay, better\"  Affect:    Blunted but improved and brighter than yesterday  Thought process:    Linear, goal-directed, no delusions or paranoia  Thought Content:    Denies suicidal homicidal ideation, no hallucinations  Insight:   Improving  Judgement:    Improving    Result Review    Result Review:  I have personally reviewed the results from the time of this admission to 10/15/2021 11:56 EDT and agree with these findings:  []  Laboratory  []  Microbiology  []  Radiology  []  EKG/Telemetry   []  Cardiology/Vascular   []  Pathology  []  Old records  []  Other:  Most notable findings include: No new labs    Medications:   !Patient Home Medications Stored in Pharmacy, , Does not apply, BID  sertraline, 50 mg, Oral, Daily        Assessment/Plan   Assessment / Plan       Active Hospital Problems:  Active Hospital Problems    Diagnosis    • Major depressive disorder, recurrent episode, moderate (HCC)    • Marijuana abuse        Plan:   1) discontinue sertraline  2) add venlafaxine 75 mg daily, side effects, risks, and benefits discussed with the patient is agreeable  3) work with patient and talk to his  about any restrictions on leaving the state  4) continue supportive therapy  5) patient to engage in all group and individual treatment modalities available on the unit  6) work on mood stabilization remain free of suicidal ideation and work on appropriate safety plan  7) work on appropriate disposition follow-up    Disposition:  I expect patient to be discharged 2 to 3 days.    Electronically signed by Humble Barkley MD, 10/15/21, 11:56 AM EDT.    "

## 2021-10-16 VITALS
DIASTOLIC BLOOD PRESSURE: 80 MMHG | RESPIRATION RATE: 16 BRPM | OXYGEN SATURATION: 100 % | BODY MASS INDEX: 20.39 KG/M2 | WEIGHT: 150.57 LBS | SYSTOLIC BLOOD PRESSURE: 112 MMHG | HEART RATE: 60 BPM | TEMPERATURE: 97.4 F | HEIGHT: 72 IN

## 2021-10-16 RX ORDER — VENLAFAXINE HYDROCHLORIDE 75 MG/1
75 CAPSULE, EXTENDED RELEASE ORAL
Qty: 30 CAPSULE | Refills: 1 | Status: SHIPPED | OUTPATIENT
Start: 2021-10-17

## 2021-10-16 RX ORDER — HYDROXYZINE PAMOATE 100 MG/1
100 CAPSULE ORAL NIGHTLY PRN
Qty: 30 CAPSULE | Refills: 1 | Status: SHIPPED | OUTPATIENT
Start: 2021-10-16

## 2021-10-16 RX ADMIN — VENLAFAXINE HYDROCHLORIDE 75 MG: 75 CAPSULE, EXTENDED RELEASE ORAL at 08:07

## 2021-10-16 NOTE — DISCHARGE SUMMARY
[facility]         DISCHARGE SUMMARY    Patient Name: Rafael Juarez  : 1995  MRN: [id number]    Date of Admission: 10/13/2021  Date of Discharge: 10/16/2021  Primary Care Physician: Provider, No Known    Consults     Date and Time Order Name Status Description    10/13/2021  5:27 AM General MD Inpatient Consult Completed           Presenting Problem:   Depression [F32.A]    Active and Resolved Hospital Problems:  Active Hospital Problems    Diagnosis POA   • Major depressive disorder, recurrent episode, moderate (HCC) [F33.1] Yes   • Marijuana abuse [F12.10] Yes      Resolved Hospital Problems   No resolved problems to display.         Hospital Course     Hospital Course:  Rafael Juarez is a 26 y.o. male admitted on a voluntary basis for depression with suicidal ideations.  Patient reported taking overdose of antidepressant.  Patient reports that he was feeling very sad, down with limited social support as well as facing court date.    Patient was started on sertraline 50 mg daily for his depression.  He had been on this medication in the past and reported he tolerated it fairly well.  However, patient reports he had actually overdosed on this medication in the past and preferred to try a different antidepressant.  Side effects, risk, benefits of venlafaxine were discussed and the patient was agreeable to a trial of venlafaxine and it was initiated at 75 mg daily.  Patient tolerated medication well with no side effects.  He reports he thinks that it will be effective.    Patient reported some difficulty sleeping trazodone was not effective and it was discontinued he was started on hydroxyzine at 100 mg on an as-needed basis for insomnia.  Patient reports that the hydroxyzine worked very well and his sleep was significantly improved.  Patient reports with improved sleep, as well as starting an antidepressant that he is feeling much better.    Patient's mood and affect have  significantly improved over the course of his hospital stay.  He initially was very regressed and isolative to his room speaking low tone and appeared quite down with a constricted affect.  Patient was very engaged in treatment process and had long conversations about his thought processes and his negative thought structures.  Patient is working on being more positive and future oriented.  Patient reports he understands that he does have a future available.  He is actually reached out to family in [place] as well as his father that lives in [place] and is gotten voice support from his family members.  He reports he feels very good about this.  He is hoping to go to the [facility] to be with family.  He did have a court date on Friday, October 15 after talking with his  on the phone and the court date was pushed back to sometime in November.  He reports that he is free to travel as long as he comes back to his court date.  He reports he talked to his  on the telephone yesterday.   did try to reach out to his  but did not get response by the patient's treatment and travel.  However patient assures me that he is able to travel and does not have to be in the [facility] while he is awaiting his court date.  The patient processed his depression and anxiety in appropriate fashion.  He has been very engaged in the therapeutic process.  He has been calm and cooperative throughout his stay with no acute anxiety or agitation.  Patient is tolerating medications well.    Patient is free of anxiety or agitation.  He is future oriented and goal-directed.  He has been very involved in his therapy.  Reports he is talked with family and is looking forward to seeing his father.  Also reports stepfather has a job lined up for him.  Patient requesting discharge today, he is here on a voluntary basis has no criteria for involuntary hospitalization and can be managed appropriately as an  outpatient at this time and will discharge    DISCHARGE Follow Up Recommendations for labs and diagnostics: Routine per primary care      Day of Discharge     Vital Signs:  Temp:  [97.4 °F (36.3 °C)-97.9 °F (36.6 °C)] 97.4 °F (36.3 °C)  Heart Rate:  [60] 60  Resp:  [16-18] 16  BP: (112)/(80) 112/80      Pertinent  and/or Most Recent Results     LAB RESULTS:      Lab 10/13/21  0723 10/13/21  0315   WBC 4.69 4.45   HEMOGLOBIN 13.3 13.8   HEMATOCRIT 39.3 40.6   PLATELETS 191 206   NEUTROS ABS 2.01 2.32   IMMATURE GRANS (ABS) 0.01 0.01   LYMPHS ABS 1.79 1.44   MONOS ABS 0.66 0.55   EOS ABS 0.20 0.12   MCV 88.3 88.5   PROTIME  --  11.0   APTT  --  24.0         Lab 10/14/21  0556 10/13/21  0315   SODIUM 139 142   POTASSIUM 3.8 3.5   CHLORIDE 104 103   CO2 22.4 25.5   ANION GAP 12.6 13.5   BUN 10 10   CREATININE 0.98 1.03   GLUCOSE 81 92   CALCIUM 8.9 8.7   TSH  --  7.550*         Lab 10/14/21  0556 10/13/21  0315   TOTAL PROTEIN 6.7 7.1   ALBUMIN 4.40 4.60   GLOBULIN 2.3 2.5   ALT (SGPT) 8 8   AST (SGOT) 16 17   BILIRUBIN 1.3* 0.7   ALK PHOS 53 53         Lab 10/13/21  0315   PROTIME 11.0   INR 1.01*                 Brief Urine Lab Results     None        Microbiology Results (last 10 days)     Procedure Component Value - Date/Time    COVID-19,CEPHEID/VALENTINA/BDMAX,COR/JOSE/PAD/SHANTANU IN-HOUSE(OR EMERGENT/ADD-ON),NP SWAB IN TRANSPORT MEDIA 3-4 HR TAT, RT-PCR - Swab, Nasopharynx [[id number]]  (Normal) Collected: 10/13/21 0628    Lab Status: Final result Specimen: Swab from Nasopharynx Updated: 10/13/21 1130     COVID19 Not Detected    Narrative:      Fact sheet for providers: https://www.fda.gov/media/655139/download     Fact sheet for patients: https://www.fda.gov/media/819219/download  Fact sheet for providers: https://www.fda.gov/media/859004/download     Fact sheet for patients: https://www.fda.gov/media/746505/download                           Imaging Results (Last 7 Days)     ** No results found for the last 168 hours. **            Labs Pending at Discharge:        Discharge Details        Discharge Medications      New Medications      Instructions Start Date   hydrOXYzine pamoate 100 MG capsule  Commonly known as: VISTARIL   100 mg, Oral, Nightly PRN      venlafaxine XR 75 MG 24 hr capsule  Commonly known as: EFFEXOR-XR   75 mg, Oral, Daily With Breakfast   Start Date: October 17, 2021            No Known Allergies      Discharge Disposition:  Home or Self Care    Diet:  Hospital:  Diet Order   Procedures   • Diet Regular         Discharge Activity:   Activity Instructions     Activity as Tolerated            Discharge Condition: Good    CODE STATUS:  Code Status and Medical Interventions:   Ordered at: 10/13/21 0925     Code Status:    CPR     Medical Interventions (Level of Support Prior to Arrest):    Full         No future appointments.    Additional Instructions for the Follow-ups that You Need to Schedule     Discharge Follow-up with Specified Provider: Pam; 2 Weeks   As directed      To: [facility]    Follow Up: 2 Weeks               Time spent on Discharge including face to face service: 30 minutes    Electronically signed by Humble Barkley MD, 10/16/21, 10:07 AM EDT.

## 2021-10-16 NOTE — PLAN OF CARE
"Goal Outcome Evaluation:      Pt states he is feeling \"exhausted and fatigued\" this evening. Reports that his stomach has been upset ever since his overdose, and that this keeps him from sleeping or eating well. Reports that he had a positive day today in that he identified some of his triggers. Denies SI/HI and any A/V hallucinations. Rates anxiety 4, depression 7. States that he feels like Effexor XR is helping him to feel better mentally, and this made him be able to come out of his room more today. Cooperative with staff. Pleasant. States that all of his needs have been met at this time. Will continue to monitor.            "

## 2021-10-16 NOTE — DISCHARGE INSTR - APPOINTMENTS
Follow up with local [facility] mental health center upon arrival in [place].

## 2021-10-16 NOTE — PLAN OF CARE
"Goal Outcome Evaluation:  Plan of Care Reviewed With: patient  Patient Agreement with Plan of Care: agrees         Pt has been resting in room, but also up on unit.  He is calm and cooperative.  He smiles during interactions with staff.  He maintains appropriate eye contact.  He denies SI/HI, A/V hallucinations.  He rates depression at 5 but denies any other s/s.  He completed his safety plan independently.  His goal today is \"to try to control my thoughts and emotions when encountering difficult situations.\"  He is requesting discharge today.  Will continue to monitor.   "